# Patient Record
Sex: FEMALE | Race: WHITE | NOT HISPANIC OR LATINO | Employment: FULL TIME | URBAN - METROPOLITAN AREA
[De-identification: names, ages, dates, MRNs, and addresses within clinical notes are randomized per-mention and may not be internally consistent; named-entity substitution may affect disease eponyms.]

---

## 2023-12-21 ENCOUNTER — OFFICE VISIT (OUTPATIENT)
Dept: ORTHOPEDIC SURGERY | Facility: CLINIC | Age: 28
End: 2023-12-21
Payer: COMMERCIAL

## 2023-12-21 VITALS — BODY MASS INDEX: 22.68 KG/M2 | WEIGHT: 128 LBS | HEIGHT: 63 IN

## 2023-12-21 DIAGNOSIS — G56.01 CARPAL TUNNEL SYNDROME OF RIGHT WRIST: Primary | ICD-10-CM

## 2023-12-21 RX ORDER — BUPROPION HYDROCHLORIDE 150 MG/1
150 TABLET ORAL EVERY MORNING
COMMUNITY
Start: 2023-12-14

## 2023-12-21 RX ORDER — TESTOSTERONE CYPIONATE 200 MG/ML
200 INJECTION, SOLUTION INTRAMUSCULAR
COMMUNITY
Start: 2023-10-26

## 2023-12-21 NOTE — PROGRESS NOTES
[facility]     Office Visit       Date: 12/21/2023   Patient Name: Kavita Ibanez  MRN: [id number]  YOB: 1995    Referring Physician: Referring, Self     Chief Complaint:   Chief Complaint   Patient presents with    Right Wrist - Pain     History of Present Illness:   Kavita Ibanez is a 28 y.o. female right-hand-dominant presented to clinic as a new patient with complaints of right hand numbness and tingling.  This involves the thumb index long and ring fingers.  This been present for 3 years and is slowly progressing with time.  They had a left carpal tunnel release performed in August 2021.  Symptoms improved after surgery.  They complain of numbness and tingling that awakens them at nigh causing them to shake out the hand.  They have been using a nighttime brace with some relief. Initially symptoms only involved the thumb index and long fingers, however the ring finger is now involved.  An EMG nerve conduction study was performed in 2021 demonstrating mild bilateral carpal tunnel syndrome without evidence of cubital tunnel syndrome.  No injury or trauma.  No other complaints or concerns.    Subjective   Review of Systems:   Review of Systems   Constitutional: Negative.  Negative for chills, fatigue and fever.   HENT: Negative.  Negative for congestion and dental problem.    Eyes: Negative.  Negative for blurred vision.   Respiratory: Negative.  Negative for shortness of breath.    Cardiovascular: Negative.  Negative for leg swelling.   Gastrointestinal: Negative.  Negative for abdominal pain.   Endocrine: Negative.  Negative for polyuria.   Genitourinary: Negative.  Negative for difficulty urinating.   Musculoskeletal:  Positive for arthralgias.   Skin: Negative.    Allergic/Immunologic: Negative.    Neurological: Negative.    Hematological: Negative.  Negative for adenopathy.   Psychiatric/Behavioral: Negative.   "Negative for behavioral problems.         Pertinent review of systems per HPI    I reviewed the patient's chief complaint, history of present illness, review of systems, past medical history, surgical history, family history, social history, medications and allergy list in the EMR on 12/21/2023 and agree with the findings above.    Objective    Quality Measures:   ACP:   ACP discussion was declined by the patient.      Tobacco:   Kavita Ibanez  reports that she has never smoked. She has never used smokeless tobacco..     Vital Signs:   Vitals:    12/21/23 0753   Weight: 58.1 kg (128 lb)   Height: 160 cm (63\")     BMI: BMI is within normal parameters. No other follow-up for BMI required.     General: No acute distress. Alert and oriented.     Ortho Exam:  Examination of the right upper extremity demonstrates no obvious deformity.  No skin lesions or abrasions.  No thenar or hypothenar atrophy.  The patient is able to make a composite fist and oppose the thumb to the small finger.  The A1 pulleys are nontender.  Patient has 5/5 APB and FDI strength.  2-point discrimination to the long finger 3mm and 4 mm to the small finger.  Positive Tinel, Durkan's, Phalen's at the wrist.  Negative Tinel's at the elbow.  The ulnar nerve is nontender palpation and does not sublux on exam.  Sensation is decreased at the median nerve distribution and intact throughout the ulnar nerve distribution.  Warm and well-perfused distally.    CTS-6 Questionnaire           Right Hand  Symptoms and History  Numbness in the median nerve territory  3.5 (3.5)   Nocturnal numbness     4 (4)   Physical Examination  Thenar atrophy and/or weakness   0 (5)    Positive Phalen's test     5 (5)   Loss of 2-point Discrimination >5 mm  0 (4.5)  Positive Tinel sign     4 (4)     Total    12.5 (26)    A patient with a score of > 12 has an 80% probability of electrodiagnostically positive carpal tunnel syndrome.     A patient with a score of > 5 has an 25% " probability of electrodiagnostically positive carpal tunnel syndrome.                Imaging / Studies:    Imaging Results (Last 24 Hours)       ** No results found for the last 24 hours. **        EMG nerve conduction studies performed on 6/9/2021 demonstrate mild bilateral carpal tunnel syndrome without evidence of cubital tunnel syndrome.    Assessment / Plan    Assessment/Plan:   Kavita Sarmiento a 28 y.o. female with right carpal tunnel syndrome and possible right cubital tunnel syndrome.    I discussed with the patient their clinical findings demonstrate carpal tunnel syndrome.  The pathophysiology of the condition, including compression of the median nerve in the carpal tunnel, was explained in detail.  It was also discussed that with more severe symptomatology, such as persistent and worsening paresthesias, that permanent nerve damage may result, which may make improvement after surgery less predictable.  Both conservative and surgical options were discussed.  Conservative treatments in the form of: observation, gentle nerve gliding exercises, night time splinting, and injection were presented.  Operative treatment in the form of open carpal tunnel release was presented and reiterated that the goal of surgery is to prevent further compression and damage to the nerve.  Further workup with electrodiagnostic studies was also discussed and recommended as the patient does have some numbness and tingling in the ring finger ulnar aspect.  I would like to assess for any electrodiagnostic evidence of the cubital tunnel in addition to carpal tunnel.  If positive for cubital tunnel, we may consider both decompression of carpal and cubital tunnels at the same time.  After expressing understanding of all options, the patient elects to proceed with obtaining electrodiagnostic studies.  The meantime, they will continue nighttime bracing at the wrist in extension splinting of the elbow.  They were agreeable with the  plan.  All questions and concerns were addressed.        ICD-10-CM ICD-9-CM   1. Carpal tunnel syndrome of right wrist  G56.01 354.0     Follow Up:   Return for Follow Up- After Testing.      Oksana Hayden MD  [facility]

## 2023-12-22 ENCOUNTER — PATIENT ROUNDING (BHMG ONLY) (OUTPATIENT)
Dept: ORTHOPEDIC SURGERY | Facility: CLINIC | Age: 28
End: 2023-12-22
Payer: COMMERCIAL

## 2023-12-22 ENCOUNTER — TELEPHONE (OUTPATIENT)
Dept: ORTHOPEDIC SURGERY | Facility: CLINIC | Age: 28
End: 2023-12-22

## 2023-12-22 NOTE — TELEPHONE ENCOUNTER
Provider: ELICIA    Caller: PATIENT    Phone Number: [phone]    Reason for Call: PATIENT WOULD LIKE AN UPDATE ON SCHEDULING HER EMG

## 2023-12-22 NOTE — PROGRESS NOTES
December 22, 2023    Hello, may I speak with Kavita Ibanez?    My name is Oly      I am  with [facility]  [address] [address].    Before we get started may I verify your date of birth? 1995    I am calling to officially welcome you to our practice and ask about your recent visit. Is this a good time to talk? No.  Voice message left      Thank you, and have a great day.

## 2024-07-01 ENCOUNTER — TELEPHONE (OUTPATIENT)
Age: 29
End: 2024-07-01
Payer: COMMERCIAL

## 2024-07-01 NOTE — TELEPHONE ENCOUNTER
"Patient sent message requesting an appointment to discuss surgery. I called and was unable to LM - \"Mailbox was full\"    HUB ok to schedule.  "

## 2024-09-16 ENCOUNTER — TELEPHONE (OUTPATIENT)
Dept: ORTHOPEDIC SURGERY | Facility: CLINIC | Age: 29
End: 2024-09-16
Payer: COMMERCIAL

## 2024-09-23 ENCOUNTER — OFFICE VISIT (OUTPATIENT)
Dept: ORTHOPEDIC SURGERY | Facility: CLINIC | Age: 29
End: 2024-09-23
Payer: COMMERCIAL

## 2024-09-23 VITALS
DIASTOLIC BLOOD PRESSURE: 76 MMHG | BODY MASS INDEX: 24.24 KG/M2 | SYSTOLIC BLOOD PRESSURE: 118 MMHG | WEIGHT: 136.8 LBS | HEIGHT: 63 IN

## 2024-09-23 DIAGNOSIS — G56.01 CARPAL TUNNEL SYNDROME OF RIGHT WRIST: Primary | ICD-10-CM

## 2024-09-23 PROCEDURE — 20526 THER INJECTION CARP TUNNEL: CPT | Performed by: STUDENT IN AN ORGANIZED HEALTH CARE EDUCATION/TRAINING PROGRAM

## 2024-09-23 PROCEDURE — 99213 OFFICE O/P EST LOW 20 MIN: CPT | Performed by: STUDENT IN AN ORGANIZED HEALTH CARE EDUCATION/TRAINING PROGRAM

## 2024-09-23 RX ORDER — LIDOCAINE HYDROCHLORIDE 10 MG/ML
1 INJECTION, SOLUTION INFILTRATION; PERINEURAL
Status: COMPLETED | OUTPATIENT
Start: 2024-09-23 | End: 2024-09-23

## 2024-09-23 RX ORDER — TRIAMCINOLONE ACETONIDE 40 MG/ML
40 INJECTION, SUSPENSION INTRA-ARTICULAR; INTRAMUSCULAR
Status: COMPLETED | OUTPATIENT
Start: 2024-09-23 | End: 2024-09-23

## 2024-09-23 RX ADMIN — TRIAMCINOLONE ACETONIDE 40 MG: 40 INJECTION, SUSPENSION INTRA-ARTICULAR; INTRAMUSCULAR at 09:40

## 2024-09-23 RX ADMIN — LIDOCAINE HYDROCHLORIDE 1 ML: 10 INJECTION, SOLUTION INFILTRATION; PERINEURAL at 09:40

## 2025-02-24 ENCOUNTER — OFFICE VISIT (OUTPATIENT)
Dept: ORTHOPEDIC SURGERY | Facility: CLINIC | Age: 30
End: 2025-02-24
Payer: COMMERCIAL

## 2025-02-24 VITALS
DIASTOLIC BLOOD PRESSURE: 60 MMHG | WEIGHT: 132 LBS | BODY MASS INDEX: 23.39 KG/M2 | HEIGHT: 63 IN | SYSTOLIC BLOOD PRESSURE: 118 MMHG

## 2025-02-24 DIAGNOSIS — G56.01 CARPAL TUNNEL SYNDROME OF RIGHT WRIST: Primary | ICD-10-CM

## 2025-02-24 PROCEDURE — 20526 THER INJECTION CARP TUNNEL: CPT | Performed by: STUDENT IN AN ORGANIZED HEALTH CARE EDUCATION/TRAINING PROGRAM

## 2025-02-24 PROCEDURE — 99213 OFFICE O/P EST LOW 20 MIN: CPT | Performed by: STUDENT IN AN ORGANIZED HEALTH CARE EDUCATION/TRAINING PROGRAM

## 2025-02-24 RX ORDER — LIDOCAINE HYDROCHLORIDE 10 MG/ML
0.5 INJECTION, SOLUTION EPIDURAL; INFILTRATION; INTRACAUDAL; PERINEURAL
Status: COMPLETED | OUTPATIENT
Start: 2025-02-24 | End: 2025-02-24

## 2025-02-24 RX ORDER — BUSPIRONE HYDROCHLORIDE 10 MG/1
TABLET ORAL
COMMUNITY
Start: 2024-05-15

## 2025-02-24 RX ORDER — TRIAMCINOLONE ACETONIDE 40 MG/ML
20 INJECTION, SUSPENSION INTRA-ARTICULAR; INTRAMUSCULAR
Status: COMPLETED | OUTPATIENT
Start: 2025-02-24 | End: 2025-02-24

## 2025-02-24 RX ADMIN — LIDOCAINE HYDROCHLORIDE 0.5 ML: 10 INJECTION, SOLUTION EPIDURAL; INFILTRATION; INTRACAUDAL; PERINEURAL at 08:16

## 2025-02-24 RX ADMIN — TRIAMCINOLONE ACETONIDE 20 MG: 40 INJECTION, SUSPENSION INTRA-ARTICULAR; INTRAMUSCULAR at 08:16

## 2025-02-24 NOTE — PROGRESS NOTES
Procedure   - Hand/Upper Extremity Injection: R carpal tunnel for carpal tunnel syndrome on 2/24/2025 8:16 AM  Indications: pain  Details: 27 G (long) needle, volar approach  Medications: 0.5 mL lidocaine PF 1% 1 %; 20 mg triamcinolone acetonide 40 MG/ML  Outcome: tolerated well, no immediate complications  Procedure, treatment alternatives, risks and benefits explained, specific risks discussed. Consent was given by the patient. Immediately prior to procedure a time out was called to verify the correct patient, procedure, equipment, support staff and site/side marked as required. Patient was prepped and draped in the usual sterile fashion.

## 2025-02-24 NOTE — PROGRESS NOTES
"                                                                 [facility]     Office Visit       Date: 02/24/2025   Patient Name: Kavita Ibanez  MRN: [id number]  YOB: 1995    Referring Physician: No ref. provider found     Chief Complaint:   Chief Complaint   Patient presents with    Follow-up     5 month recheck- Carpal tunnel syndrome of right wrist     History of Present Illness:   Kavita Ibanez is a 29 y.o. adult right-hand-dominant presenting to clinic for follow-up of right carpal tunnel syndrome.  The patient's last clinic visit they received a corticosteroid injection into the right carpal tunnel.  They report that the injection improved symptoms for several months.  These completely resolved his symptoms.  He has been wearing his brace at night.  He has had increased numbness and tingling in the long and ring fingers over the past several weeks.  He is most interested in a repeat injection today.    History of left carpal tunnel release performed in August 2021.    Subjective   Review of Systems:   Review of Systems   Pertinent review of systems per HPI    I reviewed the patient's chief complaint, history of present illness, review of systems, past medical history, surgical history, family history, social history, medications and allergy list in the EMR on 02/24/2025 and agree with the findings above.    Objective    Vital Signs:   Vitals:    02/24/25 0808   BP: 118/60   Weight: 59.9 kg (132 lb)   Height: 160 cm (62.99\")     General: No acute distress. Alert and oriented.   Cardiovascular: Palpable radial pulse.   Respiratory: Breathing is nonlabored.   Ortho Exam:  Examination of the right Upper Extremity:  No skin lesions or ecchymosis. No edema.    Interosseous wasting is not visualized   Thenar atrophy is not visualized     Hypothenar atrophy is not visualized     positive Tinel's at the carpal tunnel, positive Durkan's compression test, positive Phalen's " maneuver   negative Tinel's over Guyon's canal   negative Tinel's at the ulnar nerve at the elbow   negative Elbow Flexion Test   negative Subluxation of the ulnar nerve   negative tenderness with deep palpation of the pronator   negative Tinel's with median nerve at pronator   5/5 APB strength   5/5 FDI strength   Sensation is intact to light touch over the ulnar nerve and decreased over the median nerve distribution.   Digits warm and well-perfused      Imaging / Studies:    Imaging Results (Last 24 Hours)       ** No results found for the last 24 hours. **        EMG nerve conduction studies performed on 6/9/2021 demonstrate mild bilateral carpal tunnel syndrome without evidence of cubital tunnel syndrome.      EMG nerve conduction study performed on 1/5/2024 demonstrates very mild carpal and cubital tunnel syndrome on the right.    Procedure Note:  After reviewing the risks, benefits and alternatives to a steroid injection, which include but are not limited to; hypopigmentation, fat necrosis/atrophy, pain, swelling, bleeding, bruising, damage to nearby nerves/vessels, allergic reaction , transient elevation in blood glucose levels and infection a verbal consent was obtained. A time-out was then performed and the affected hand was prepped with chlorhexadine soap and ethyl chloride was used to numb the skin. The right carpal tunnel was injected with 0.5cc: 0.5cc mixture of Kenalog - 40 mg/ml and Lidocaine - 1% / 2 ml. The injection was well tolerated and a sterile dressing was applied. There were no complications. I advised the patient that they might experience some local discomfort for the next couple days and can apply ice to the site as needed.    Assessment / Plan    Assessment/Plan:   Kavita Ibanez is a 29 y.o. adult with right carpal tunnel syndrome.    The patient has had good improvements in his symptoms with prior injection, bracing, nerve glides.  He is most interested in repeat injections today.   This was provided tolerated well.  I will see him back in 3 months for reevaluation.  We may consider surgical release in the winter.  All questions and concerns were addressed.  He was agreeable.      ICD-10-CM ICD-9-CM   1. Carpal tunnel syndrome of right wrist  G56.01 354.0     Follow Up:   Return in about 3 months (around 5/24/2025) for Follow Up.      Oksana Hayden MD  [facility]

## 2025-06-02 ENCOUNTER — OFFICE VISIT (OUTPATIENT)
Dept: ORTHOPEDIC SURGERY | Facility: CLINIC | Age: 30
End: 2025-06-02
Payer: COMMERCIAL

## 2025-06-02 VITALS
DIASTOLIC BLOOD PRESSURE: 62 MMHG | SYSTOLIC BLOOD PRESSURE: 116 MMHG | BODY MASS INDEX: 23.39 KG/M2 | WEIGHT: 132 LBS | HEIGHT: 63 IN

## 2025-06-02 DIAGNOSIS — G56.01 CARPAL TUNNEL SYNDROME OF RIGHT WRIST: Primary | ICD-10-CM

## 2025-06-02 RX ORDER — LIDOCAINE HYDROCHLORIDE 10 MG/ML
0.5 INJECTION, SOLUTION EPIDURAL; INFILTRATION; INTRACAUDAL; PERINEURAL
Status: COMPLETED | OUTPATIENT
Start: 2025-06-02 | End: 2025-06-02

## 2025-06-02 RX ORDER — ROPIVACAINE HYDROCHLORIDE 5 MG/ML
0.5 INJECTION, SOLUTION EPIDURAL; INFILTRATION; PERINEURAL
Status: COMPLETED | OUTPATIENT
Start: 2025-06-02 | End: 2025-06-02

## 2025-06-02 RX ADMIN — LIDOCAINE HYDROCHLORIDE 0.5 ML: 10 INJECTION, SOLUTION EPIDURAL; INFILTRATION; INTRACAUDAL; PERINEURAL at 09:05

## 2025-06-02 RX ADMIN — ROPIVACAINE HYDROCHLORIDE 0.5 ML: 5 INJECTION, SOLUTION EPIDURAL; INFILTRATION; PERINEURAL at 09:05

## 2025-06-02 NOTE — PROGRESS NOTES
Procedure   - Hand/Upper Extremity Injection: R carpal tunnel for carpal tunnel syndrome on 6/2/2025 9:05 AM  Indications: pain  Details: 27 G needle  Medications: 0.5 mL ropivacaine 0.5 %; 0.5 mL lidocaine PF 1% 1 %  Outcome: tolerated well, no immediate complications  Procedure, treatment alternatives, risks and benefits explained, specific risks discussed. Consent was given by the patient. Immediately prior to procedure a time out was called to verify the correct patient, procedure, equipment, support staff and site/side marked as required. Patient was prepped and draped in the usual sterile fashion.

## 2025-06-02 NOTE — PROGRESS NOTES
"                                                                 [facility]     Office Visit       Date: 06/02/2025   Patient Name: Kavita Ibanez  MRN: [id number]  YOB: 1995    Referring Physician: No ref. provider found     Chief Complaint:   Chief Complaint   Patient presents with    Follow-up     3 month recheck- Carpal tunnel syndrome of right wrist     History of Present Illness:   Kavita Ibanez is a 29 y.o. adult right-hand-dominant presented clinic for follow-up of right carpal tunnel syndrome.  At the patient's last clinic visit they received a corticosteroid injection.  They report well since her last clinic visit.  Over the past week or so they have increased numbness and tingling to the right hand.  No pain currently with 6/10 pain with use.  Numbness and tingling is worse with cutting hair.  No other complaints.    Subjective   Review of Systems:   Review of Systems   Pertinent review of systems per HPI    I reviewed the patient's chief complaint, history of present illness, review of systems, past medical history, surgical history, family history, social history, medications and allergy list in the EMR on 06/02/2025 and agree with the findings above.    Objective    Vital Signs:   Vitals:    06/02/25 0856   BP: 116/62   Weight: 59.9 kg (132 lb)   Height: 160 cm (62.99\")     General: No acute distress. Alert and oriented.   Cardiovascular: Palpable radial pulse.   Respiratory: Breathing is nonlabored.   Ortho Exam:  Examination of the right Upper Extremity:  No skin lesions or ecchymosis. No edema.    Interosseous wasting is not visualized   Thenar atrophy is not visualized     Hypothenar atrophy is not visualized     positive Tinel's at the carpal tunnel, positive Durkan's compression test, positive Phalen's maneuver   negative Tinel's over Guyon's canal   negative Tinel's at the ulnar nerve at the elbow   negative Elbow Flexion Test   negative Subluxation of the " ulnar nerve   negative tenderness with deep palpation of the pronator   negative Tinel's with median nerve at pronator   5/5 APB strength   5/5 FDI strength   Sensation is decreased to light touch over the median nerve distribution   Digits warm and well-perfused      Imaging / Studies:    Imaging Results (Last 24 Hours)       ** No results found for the last 24 hours. **        EMG nerve conduction studies performed on 6/9/2021 demonstrate mild bilateral carpal tunnel syndrome without evidence of cubital tunnel syndrome.      EMG nerve conduction study performed on 1/5/2024 demonstrates very mild carpal and cubital tunnel syndrome on the right.    Procedure Note:  After reviewing the risks, benefits and alternatives to a steroid injection, which include but are not limited to; hypopigmentation, fat necrosis/atrophy, pain, swelling, bleeding, bruising, damage to nearby nerves/vessels, allergic reaction , transient elevation in blood glucose levels and infection a verbal consent was obtained. A time-out was then performed and the affected hand was prepped with chlorhexadine soap and ethyl chloride was used to numb the skin. The right carpal tunnel was injected with 0.5cc: 0.5cc mixture of Kenalog - 40 mg/ml and Lidocaine - 1% / 2 ml. The injection was well tolerated and a sterile dressing was applied. There were no complications. I advised the patient that they might experience some local discomfort for the next couple days and can apply ice to the site as needed.    Assessment / Plan    Assessment/Plan:   Kavita Ibanez is a 29 y.o. adult with right carpal tunnel syndrome.    He continues to do well with nonoperative treatment including bracing, nerve glides, and injection.  His last injection has worn off.  Discussed options moving forward including repeat injections versus surgery.  He is most interested in repeat injection today.  This provided tolerated well.  I will see him back in 3 months for reevaluation.   Will discuss surgical treatment in December.  All question and concerns were addressed.  He is agreeable.      ICD-10-CM ICD-9-CM   1. Carpal tunnel syndrome of right wrist  G56.01 354.0     Follow Up:   Return in about 3 months (around 9/2/2025) for Follow Up.      Oksana Hayden MD  [facility]